# Patient Record
Sex: FEMALE | Race: WHITE | Employment: FULL TIME | URBAN - METROPOLITAN AREA
[De-identification: names, ages, dates, MRNs, and addresses within clinical notes are randomized per-mention and may not be internally consistent; named-entity substitution may affect disease eponyms.]

---

## 2018-03-29 ENCOUNTER — OFFICE VISIT (OUTPATIENT)
Dept: CARDIOLOGY CLINIC | Age: 48
End: 2018-03-29

## 2018-03-29 VITALS
HEART RATE: 80 BPM | BODY MASS INDEX: 38.08 KG/M2 | WEIGHT: 242.6 LBS | DIASTOLIC BLOOD PRESSURE: 84 MMHG | SYSTOLIC BLOOD PRESSURE: 130 MMHG | RESPIRATION RATE: 16 BRPM | HEIGHT: 67 IN

## 2018-03-29 DIAGNOSIS — E66.01 MORBID OBESITY (HCC): ICD-10-CM

## 2018-03-29 DIAGNOSIS — R07.9 CHEST PAIN, UNSPECIFIED TYPE: Primary | ICD-10-CM

## 2018-03-29 DIAGNOSIS — R00.2 PALPITATIONS: ICD-10-CM

## 2018-03-29 DIAGNOSIS — R73.03 PREDIABETES: ICD-10-CM

## 2018-03-29 DIAGNOSIS — F43.9 STRESS: ICD-10-CM

## 2018-03-29 RX ORDER — MOMETASONE FUROATE 50 UG/1
2 SPRAY, METERED NASAL DAILY
COMMUNITY

## 2018-03-29 RX ORDER — CHOLECALCIFEROL TAB 125 MCG (5000 UNIT) 125 MCG
TAB ORAL DAILY
COMMUNITY

## 2018-03-29 RX ORDER — OMEPRAZOLE 20 MG/1
20 CAPSULE, DELAYED RELEASE ORAL DAILY
COMMUNITY

## 2018-03-29 NOTE — PROGRESS NOTES
[facility] [place] [address]:   [phone]    CC: palpitations    HPI: Phil Ruby is a 56y.o. year-old who presents for follow up regarding her palpitations. She has been taking prilosec recently for GERD but Monday had severe chest pressure in the throat. She felt hot and it spread up to the jaw, and ears and face and down into the chest. Came and left a few times over three hours. No dyspnea, dizziness, or palpitations. Then yesterday had some flip flops in the heart. Discussed her calcium score of zero and cad rf, will pursue stress echo to look at the chest pain .  Lives in [facility] and able to climb steps     She has had palpitations on and off for years without trigger, had worn a holter monitor around 10 years ago that showed PVCs  Only thing that seemed to improved palpitations is when she stopped an anti-depressant years ago  She had seen me 10/14 and had a normal echocardiogram and zero calcium score at that time  A few weeks after her appointment she had a bad episode while folding laundry, felt like her heart was \"flip flopping\"  She didn't have have any other symptoms, episode lasted less than a minute  In the past 4 days her heart has felt like it was \"racing\"  She doesn't believe it is happening when she is stressed, no relation to exercise  She cut out caffeine after her last appointment  Episodes are occurring once/day - can last 30 minutes to 2 hours  Says her highest pulse is 112bpm, normally her pulse is around 100bpm when she feels like her heart is racing  Normally her heart rate is around 80bpm  Denies any syncope, dizziness, dyspnea, edema, chest pain  Says her thyroid was checked in November by Dr. Uli Bush and it was ok  She is due to have another \"comprehensive panel of labs\" checked by her PCP soon  She is exercising regularly - not a trigger for palpitations  She does have a history of panic attacks and anxiety  She hasn't been sleeping well recently and her palpitations have been worse over the last 4 days  Would like to avoid medication for suppression if possible    Assessment/Plan:  1. Thyroid disease- on levothyroxine, labs checked in 11/14    2. Prediabetes- working on diet, exercise and weight loss  3. Obesity Body mass index is 38 kg/(m^2). working on diet, exercise and weight loss  4. Anxiety/depression- working on stress reduction   5. Palpitations/hx of PVCs - increased palpitations recently, will order 24 hour holter monitor to assess for arrhythmias, check CMP and magnesium level, will contact her with results    Echo 10/14 - normal  Coronary calcium score 10/14 - zero    Fhx mother with PAD DM, Dad with HTN, gf with stent  Soc occ etoh remote hx tobacco, 1/4 ppd previously, quit 2006. No caffeine now     She  has a past medical history of Depression; Headache(784.0); and Thyroid disease. Cardiovascular ROS: positive for palpitations   Respiratory ROS: negative for - cough or hemoptysis  Neurological ROS: no TIA or stroke symptoms  All other systems negative except as above. PE  Vitals:    03/29/18 0807   BP: 130/84   Pulse: 80   Resp: 16   Weight: 242 lb 9.6 oz (110 kg)   Height: 5' 7\" (1.702 m)    Body mass index is 38 kg/(m^2).    General appearance - alert, well appearing, and in no distress  Mental status - affect appropriate to mood  Eyes - sclera anicteric, moist mucous membranes  Neck - supple, no significant adenopathy  Lymphatics - no  lymphadenopathy  Chest - clear to auscultation, no wheezes, rales or rhonchi  Heart - normal rate, regular rhythm, normal S1, S2, no murmurs, rubs, clicks or gallops  Abdomen - soft, nontender, nondistended, no masses or organomegaly  Back exam - full range of motion, no tenderness  Neurological - cranial nerves II through XII grossly intact, no focal deficit  Musculoskeletal - no muscular tenderness noted, normal strength  Extremities - peripheral pulses normal, no pedal edema  Skin - normal coloration  no aubrey    12 lead ECG: NSR    Recent Labs:  No results found for: CHOL  Lab Results   Component Value Date/Time    Creatinine 0.65 02/25/2015 08:53 AM     Lab Results   Component Value Date/Time    BUN 10 02/25/2015 08:53 AM     Lab Results   Component Value Date/Time    Potassium 4.8 02/25/2015 08:53 AM     No results found for: HBA1C, [id number]  Lab Results   Component Value Date/Time    HGB 13.0 01/12/2011 09:20 AM     Lab Results   Component Value Date/Time    PLATELET 561 (H) 13/49/5076 09:20 AM       Reviewed:  Past Medical History:   Diagnosis Date    Depression     Headache(784.0)     Thyroid disease      History   Smoking Status    Former Smoker   Smokeless Tobacco    Never Used     Comment: quit 5 years ago     History   Alcohol Use    Yes     Comment: special occassion     Allergies   Allergen Reactions    Amoxicillin Rash    Cephalexin Swelling    Pcn [Penicillins] Unknown (comments)       Current Outpatient Prescriptions   Medication Sig    cholecalciferol, VITAMIN D3, (VITAMIN D3) 5,000 unit tab tablet Take  by mouth daily.  mometasone (NASONEX) 50 mcg/actuation nasal spray 2 Sprays daily.  omeprazole (PRILOSEC) 20 mg capsule Take 20 mg by mouth daily.  DOCOSAHEXANOIC ACID/EPA (FISH OIL PO) Take 1 Cap by mouth daily.  Cetirizine (ZYRTEC) 10 mg cap Take 1 Cap by mouth daily.  MV-MN/IRON FUM/FA/OMEGA3,6,9#3 (WOMEN'S MULTI PO) Take 1 Cap by mouth daily.  coenzyme Q-10 (CO Q-10) 200 mg capsule Take  by mouth daily.  levothyroxine (SYNTHROID) 75 mcg tablet Take 75 mcg by mouth Daily (before breakfast).  DYMISTA 137-50 mcg/spray spry 1 [place] by Both Nostrils route daily.  VITAMIN D2 50,000 unit capsule Take 50,000 Units by mouth every seven (7) days.  ginkgo biloba 60 mg cap Take 1 Cap by mouth daily.  MAGNESIUM CITRATE PO Take 1 Cap by mouth daily. No current facility-administered medications for this visit.         MD Mural[facility] [place]  [facility] [address], [address]

## 2018-03-29 NOTE — MR AVS SNAPSHOT
[address] [place] 
[phone] Patient: Rita Flores MRN: [id number] Visit Information Date & Time Provider Department Dept. Phone Encounter #  
 3/29/2018  8:00 AM Rosalba Valles MD [facility] [place] [phone] [id number] Your Appointments 4/9/2018  9:00 AM  
ECHO CARDIOGRAMS 2D with Jhonny Connors [facility] ([facility] SCHEDULING) Appt Note: stress echo for chest pain per Dr. Aye Wood [address]  [address] [place]  
[address] [facility]  
  
    
 4/9/2018  9:00 AM  
STRESS ECHOCARDIOGRAMS with Maurice Brown [facility] ([facility] SCHEDULING) Appt Note: stress echo for chest pain per Dr. Aye Wood [address]  [address] [place]  
[address] [id number] Upcoming Health Maintenance Date Due DTaP/Tdap/Td series (1 - Tdap) 7/15/1992 PAP AKA CERVICAL CYTOLOGY 7/15/1992 Influenza Age 5 to Adult 8/1/2017 Allergies as of 3/29/2018  Review Complete On: 3/29/2018 By: Dana Cyr Severity Noted Reaction Type Reactions Amoxicillin  03/16/2010    Rash Cephalexin  03/16/2010    Swelling Pcn [Penicillins]  03/16/2010    Unknown (comments) Current Immunizations  Never Reviewed No immunizations on file. Not reviewed this visit You Were Diagnosed With   
  
 Codes Comments Chest pain, unspecified type    -  Primary ICD-10-CM: R07.9 ICD-9-CM: 786.50 Palpitations     ICD-10-CM: R00.2 ICD-9-CM: 785.1 Vitals BP Pulse Resp Height(growth percentile) Weight(growth percentile) BMI  
 130/84 (BP 1 Location: Right arm, BP Patient Position: Sitting) 80 16 5' 7\" (1.702 m) 242 lb 9.6 oz (110 kg) 38 kg/m2 OB Status Smoking Status Having regular periods Former Smoker Vitals History BMI and BSA Data Body Mass Index Body Surface Area  
 38 kg/m 2 2.28 m 2 Your Updated Medication List  
  
   
This list is accurate as of 3/29/18  8:39 AM.  Always use your most recent med list.  
  
  
  
  
 cholecalciferol (VITAMIN D3) 5,000 unit Tab tablet Commonly known as:  VITAMIN D3 Take  by mouth daily. CO Q-10 200 mg capsule Generic drug:  coenzyme Q-10 Take  by mouth daily. DYMISTA 137-50 mcg/spray [place] Generic drug:  azelastine-fluticasone 1 [place] by Both Nostrils route daily. FISH OIL PO Take 1 Cap by mouth daily. ginkgo biloba 60 mg Cap Take 1 Cap by mouth daily. levothyroxine 75 mcg tablet Commonly known as:  SYNTHROID Take 75 mcg by mouth Daily (before breakfast). MAGNESIUM CITRATE PO Take 1 Cap by mouth daily. NASONEX 50 mcg/actuation nasal spray Generic drug:  mometasone 2 Sprays daily. omeprazole 20 mg capsule Commonly known as:  PRILOSEC Take 20 mg by mouth daily. VITAMIN D2 50,000 unit capsule Generic drug:  ergocalciferol Take 50,000 Units by mouth every seven (7) days. WOMEN'S MULTI PO Take 1 Cap by mouth daily. ZyrTEC 10 mg Cap Generic drug:  Cetirizine Take 1 Cap by mouth daily. We Performed the Following AMB POC EKG ROUTINE W/ 12 LEADS, INTER & REP [55776 CPT(R)] Introducing [facility]! Dear Rose Gibson: Thank you for requesting a [employer] account. Our records indicate that you already have an active [employer] account. You can access your account anytime at [email or web address] Did you know that you can access your hospital and ER discharge instructions at any time in [employer]? You can also review all of your test results from your hospital stay or ER visit. Additional Information If you have questions, please visit the Frequently Asked Questions section of the [email or web address]hart website at [email or web address] Remember, [employer] is NOT to be used for urgent needs. For medical emergencies, dial 911. Now available from your iPhone and Android! Please provide this summary of care documentation to your next provider. Your primary care clinician is listed as Kt Lewis. If you have any questions after today's visit, please call [phone].

## 2018-04-02 ENCOUNTER — TELEPHONE (OUTPATIENT)
Dept: CARDIOLOGY CLINIC | Age: 48
End: 2018-04-02

## 2018-04-03 ENCOUNTER — TELEPHONE (OUTPATIENT)
Dept: CARDIOLOGY CLINIC | Age: 48
End: 2018-04-03

## 2018-04-03 NOTE — TELEPHONE ENCOUNTER
Called patient. Left a message stating that her test has been cancelled due to insurance denial. Provided contact number to Kenyon and advised patient to call with any questions.

## 2018-04-03 NOTE — TELEPHONE ENCOUNTER
The Stress test scheduled for 4/5/18 that was ordered by Dr. Rebecca Osorio was DENIED by [facility]. If you have any questions you can reached Kenyon at [phone]. Please cancel the pt's appt.      ThanksMil

## 2018-04-04 NOTE — TELEPHONE ENCOUNTER
Pt following up on her insurance denying her test. Pt states she called insurance and they have no knowledge of the test. Please give her a call back @ [phone] . Thanks!

## 2018-04-09 ENCOUNTER — CLINICAL SUPPORT (OUTPATIENT)
Dept: CARDIOLOGY CLINIC | Age: 48
End: 2018-04-09

## 2018-04-09 DIAGNOSIS — R07.9 CHEST PAIN, UNSPECIFIED TYPE: ICD-10-CM

## 2018-04-09 DIAGNOSIS — R00.2 PALPITATIONS: ICD-10-CM

## 2018-04-09 NOTE — TELEPHONE ENCOUNTER
I have spoke to patient regarding her Stress Echo. Testing has been approved and prior Alvina Yu has been received. she has been rescheduled for this week.  2 pt identifiers used

## 2018-04-09 NOTE — TELEPHONE ENCOUNTER
I have spoke to patient regarding her Stress Echo. Testing has been approved and prior Rakesh Busby has been received. she has been rescheduled for this week.  2 pt identifiers used

## 2018-04-10 ENCOUNTER — TELEPHONE (OUTPATIENT)
Dept: CARDIOLOGY CLINIC | Age: 48
End: 2018-04-10

## 2018-04-19 PROBLEM — E66.01 MORBID OBESITY (HCC): Status: ACTIVE | Noted: 2018-04-19

## 2018-04-19 PROBLEM — F43.9 STRESS: Status: ACTIVE | Noted: 2018-04-19

## 2018-04-19 PROBLEM — R00.2 PALPITATIONS: Status: ACTIVE | Noted: 2018-04-19

## 2018-04-19 PROBLEM — R73.03 PREDIABETES: Status: ACTIVE | Noted: 2018-04-19

## 2021-09-16 ENCOUNTER — OFFICE VISIT (OUTPATIENT)
Dept: CARDIOLOGY CLINIC | Age: 51
End: 2021-09-16
Payer: COMMERCIAL

## 2021-09-16 VITALS
RESPIRATION RATE: 14 BRPM | DIASTOLIC BLOOD PRESSURE: 80 MMHG | WEIGHT: 239.8 LBS | HEIGHT: 67 IN | SYSTOLIC BLOOD PRESSURE: 130 MMHG | HEART RATE: 78 BPM | OXYGEN SATURATION: 98 % | BODY MASS INDEX: 37.64 KG/M2

## 2021-09-16 DIAGNOSIS — F43.9 STRESS: ICD-10-CM

## 2021-09-16 DIAGNOSIS — R00.2 PALPITATIONS: Primary | ICD-10-CM

## 2021-09-16 DIAGNOSIS — E66.01 MORBID OBESITY (HCC): ICD-10-CM

## 2021-09-16 DIAGNOSIS — I49.3 VENTRICULAR ECTOPIC ACTIVITY: ICD-10-CM

## 2021-09-16 PROCEDURE — 99215 OFFICE O/P EST HI 40 MIN: CPT | Performed by: SPECIALIST

## 2021-09-16 RX ORDER — LEVOTHYROXINE SODIUM 100 UG/1
100 TABLET ORAL
COMMUNITY

## 2021-09-16 RX ORDER — TRIAMCINOLONE ACETONIDE 55 UG/1
2 SPRAY, METERED NASAL DAILY
COMMUNITY

## 2021-09-16 RX ORDER — LEVOCETIRIZINE DIHYDROCHLORIDE 5 MG/1
5 TABLET, FILM COATED ORAL DAILY
COMMUNITY

## 2021-09-16 NOTE — PROGRESS NOTES
HISTORY OF PRESENT ILLNESS  Ingris Ludwig is a 49 y.o. female. She is seen today for palpitations. She was last seen by Dr. Brayan Florez for similar complaint more than 3 years ago. At that time she was noted to have ventricular premature beats on a monitor but her echo and stress test were normal.  The palpitations have gone away although they have now come back. They do tend to be episodic lasting several days at a time. She does have gastroesophageal reflux symptoms and is planning to have upper and lower endoscopy within the next several weeks. Last night she became concerned because her blood pressure was 140/90 and her heart rate was 60. She has been working out and riding an exercise [place] bike for 25 minutes at a time. Her weight is gone down 15 pounds since working out harder. She only drinks 1 cup of coffee per day. HPI  Patient Active Problem List   Diagnosis Code    Hypothyroid E03.9    Prediabetes R73.03    Palpitations R00.2    Morbid obesity ([facility] [address] E66.01    Stress F43.9     Current Outpatient Medications   Medication Sig Dispense Refill    levothyroxine (SYNTHROID) 100 mcg tablet Take 100 mcg by mouth Daily (before breakfast).  PROGESTERONE 20mg for 2 weeks 100mg for 1 week and none 1 week      testosterone propionate (TESTOSTERONE CREAM) Apply  to affected area. Testosterone Cream 2 pumps every morning      levocetirizine (Xyzal) 5 mg tablet Take 5 mg by mouth daily.  triamcinolone (NASACORT AQ) 55 mcg nasal inhaler 2 Sprays by Both Nostrils route daily.  cholecalciferol, VITAMIN D3, (VITAMIN D3) 5,000 unit tab tablet Take  by mouth daily.  omeprazole (PRILOSEC) 20 mg capsule Take 20 mg by mouth daily.  mometasone (NASONEX) 50 mcg/actuation nasal spray 2 Sprays daily. (Patient not taking: Reported on 9/16/2021)      DOCOSAHEXANOIC ACID/EPA (FISH OIL PO) Take 1 Cap by mouth daily.  (Patient not taking: Reported on 9/16/2021)      Cetirizine (ZYRTEC) 10 mg cap Take 1 Cap by mouth daily. (Patient not taking: Reported on 9/16/2021)      DYMISTA 137-50 mcg/spray spry 1 [place] by Both Nostrils route daily. (Patient not taking: Reported on 9/16/2021)  5    MV-MN/IRON FUM/FA/OMEGA3,6,9#3 (WOMEN'S MULTI PO) Take 1 Cap by mouth daily. (Patient not taking: Reported on 9/16/2021)      coenzyme Q-10 (CO Q-10) 200 mg capsule Take  by mouth daily. (Patient not taking: Reported on 9/16/2021)      levothyroxine (SYNTHROID) 75 mcg tablet Take 75 mcg by mouth Daily (before breakfast). (Patient not taking: Reported on 9/16/2021)       Past Medical History:   Diagnosis Date    Depression     Headache(784.0)     Thyroid disease      Past Surgical History:   Procedure Laterality Date    HX HEENT      teeth removed x 4    HX UROLOGICAL         Review of Systems   Constitutional: Positive for weight loss. Cardiovascular: Positive for palpitations. Gastrointestinal: Positive for heartburn. All other systems reviewed and are negative. Visit Vitals  /80 (BP 1 Location: Right arm, BP Patient Position: Sitting, BP Cuff Size: Adult)   Pulse 78   Resp 14   Ht 5' 7\" (1.702 m)   Wt 239 lb 12.8 oz (108.8 kg)   SpO2 98%   BMI 37.56 kg/m²       Physical Exam  Vitals and nursing note reviewed. Constitutional:       Appearance: She is obese. HENT:      Head: Normocephalic. Right Ear: External ear normal.      Left Ear: External ear normal.      Nose: Nose normal.      Mouth/Throat:      Mouth: Mucous membranes are moist.   Eyes:      Extraocular Movements: Extraocular movements intact. Cardiovascular:      Rate and Rhythm: Normal rate and regular rhythm. Occasional extrasystoles are present. Heart sounds: Normal heart sounds. Pulmonary:      Breath sounds: Normal breath sounds. Abdominal:      Palpations: Abdomen is soft. Musculoskeletal:         General: Normal range of motion. Cervical back: Normal range of motion. Skin:     General: Skin is warm. Neurological:      General: No focal deficit present. Mental Status: She is alert. Psychiatric:         Mood and Affect: Mood normal.         ASSESSMENT and PLAN  She does have episodic symptomatic palpitations probably from ventricular premature beats and some were detected on exam today. She has great exercise tolerance. I do not think she needs a daily beta-blocker which could help her although she might benefit from a pill in the pocket approach taking a short acting beta-blocker as needed. However she is reluctant to start this at this time and prefers to have her endoscopy first.  If she improves with therapy from the gastroenterologist and she will except that but if she does not then she will call back and could have a prescription for short acting propranolol to take as needed. I will see her back on an as-needed basis.

## 2021-12-23 ENCOUNTER — TELEPHONE (OUTPATIENT)
Dept: CARDIOLOGY CLINIC | Age: 51
End: 2021-12-23

## 2021-12-23 RX ORDER — PROPRANOLOL HYDROCHLORIDE 20 MG/1
20 TABLET ORAL
Qty: 30 TABLET | Refills: 1 | Status: SHIPPED | OUTPATIENT
Start: 2021-12-23 | End: 2022-02-25

## 2021-12-23 NOTE — TELEPHONE ENCOUNTER
Patient states she would like to try the beta-blocker (low level). States she continues to have the issue.         [facility]:[phone]

## 2021-12-23 NOTE — TELEPHONE ENCOUNTER
Identifiers x 2. Reiterated Dr. David Zarate last office note regarding low dose propanolol prn. Informed that Dr. Magnolia Hernandez is out of office, to return next week. Requested that I forward to another MD to start over weekend. Requested Prescriptions     Signed Prescriptions Disp Refills    propranoloL (INDERAL) 20 mg tablet 30 Tablet 1     Sig: Take 1 Tablet by mouth daily as needed (palpitations). Authorizing Provider: Gilberto Heart     Ordering User: Tayler Chain     Identifiers x 2. Informed of the above. Verbalized understanding.

## 2022-02-25 DIAGNOSIS — R00.2 PALPITATIONS: Primary | ICD-10-CM

## 2022-02-25 RX ORDER — PROPRANOLOL HYDROCHLORIDE 20 MG/1
TABLET ORAL
Qty: 30 TABLET | Refills: 5 | Status: SHIPPED | OUTPATIENT
Start: 2022-02-25

## 2022-02-25 NOTE — TELEPHONE ENCOUNTER
Requested Prescriptions     Signed Prescriptions Disp Refills    propranoloL (INDERAL) 20 mg tablet 30 Tablet 5     Sig: TAKE ONE TABLET BY MOUTH ONE TIME DAILY AS NEEDED     Authorizing Provider: Morelia Samuels     Ordering User: Sotero Landers Mail verbal order